# Patient Record
Sex: FEMALE | Race: WHITE
[De-identification: names, ages, dates, MRNs, and addresses within clinical notes are randomized per-mention and may not be internally consistent; named-entity substitution may affect disease eponyms.]

---

## 2017-03-23 NOTE — REP
CT CERVICAL SPINE WITHOUT CONTRAST:

 

HISTORY:  Neck pain.

 

There is no acute fracture or subluxation.  A disc bulge with associated

osteophyte formation is present at the C5-6 level.  There is minimal narrowing of

the spinal canal.  Bilateral uncinate process hypertrophy is present. This

produces mild and minimal narrowing of the right and left C5 neural foramina

respectively.  The remaining neural foramina are patent. The C5-6 intervertebral

disc is decreased in height consistent with disc degeneration.  There is slight

loss of the normal lordotic curve.

 

IMPRESSION:

 

1.  There is no acute fracture or subluxation.

 

2.  There is cervical spondylosis of the C5-6 level.

 

 

Signed by

Patricio Chandler MD 03/23/2017 10:28 A

## 2019-09-04 NOTE — REP
Clinical:  Trauma.

 

Technique:  Four views of the left hemithorax.

 

Findings:

Four views of the left hemithorax demonstrates no obvious acute rib fracture or

pathology.

 

Impression:

Normal left rib series

 

 

Electronically Signed by

Hilario Martinez MD 09/04/2019 07:43 A

## 2020-05-18 ENCOUNTER — HOSPITAL ENCOUNTER (OUTPATIENT)
Dept: HOSPITAL 53 - M SFHCWAGY | Age: 50
End: 2020-05-18
Attending: OBSTETRICS & GYNECOLOGY
Payer: COMMERCIAL

## 2020-05-18 DIAGNOSIS — Z12.4: Primary | ICD-10-CM

## 2020-05-22 ENCOUNTER — HOSPITAL ENCOUNTER (OUTPATIENT)
Dept: HOSPITAL 53 - M WHC | Age: 50
End: 2020-05-22
Attending: OBSTETRICS & GYNECOLOGY
Payer: COMMERCIAL

## 2020-05-22 DIAGNOSIS — Z12.31: Primary | ICD-10-CM

## 2020-05-22 NOTE — REPMRS
Patient History

The patient states she had a clinical breast exam in May 2020.No 

known family history of cancer.

 

Digital Woman Screen Mammo: May 22, 2020 - Exam #: 

[id number]

Bilateral CC and MLO view(s) were taken.

 

Technologist: Linh Aragon, Technologist

Prior study comparison: June 13, 2018, digital mammo diagnostic 

bilateral, performed at [facility].  November 16, 2015, bilateral digital mammo screening bilat, performed at 

[facility].  October 20, 2014, bilateral digital 

mammo screening bilat, performed at [facility].

 

FINDINGS: There are scattered fibroglandular densities.  The 

Volpara volumetric breast density category is: B.  There is a 

moderate amount of residual fibroglandular tissue which is fairly

symmetric. There is no interval development of dominant mass, 

architectural distortion, or grouped microcalcification typical 

of malignancy. There has been no change in the appearance of the 

mammogram from the prior studies.

3-D tomosynthesis shows no additional findings.

 

Assessment: BI-RADS/ACR category 1 mammogram. Negative Mammogram.

 

Recommendation

Routine screening mammogram of both breasts in 1 year (for women 

over age 40).

 

This patient's Lifetime Breast Cancer RIsk is estimated at 8.9 %.

This mammogram was interpreted with the aid of an FDA-approved 

computer-aided dectection system.

 

Electronically Signed By: Brandon Perez MD 05/22/20 0843